# Patient Record
Sex: FEMALE | Race: WHITE | NOT HISPANIC OR LATINO | URBAN - NONMETROPOLITAN AREA
[De-identification: names, ages, dates, MRNs, and addresses within clinical notes are randomized per-mention and may not be internally consistent; named-entity substitution may affect disease eponyms.]

---

## 2017-09-13 PROBLEM — Z96.1: Noted: 2019-11-01

## 2017-09-13 PROBLEM — H35.373: Noted: 2017-09-13

## 2017-09-13 PROBLEM — Z96.1: Noted: 2017-06-16

## 2017-09-13 PROBLEM — H26.493: Noted: 2017-06-16

## 2017-09-13 PROBLEM — H40.013: Noted: 2019-05-06

## 2017-09-13 PROBLEM — H04.123: Noted: 2019-11-01

## 2019-05-06 ENCOUNTER — IMPORTED ENCOUNTER (OUTPATIENT)
Dept: URBAN - NONMETROPOLITAN AREA CLINIC 1 | Facility: CLINIC | Age: 69
End: 2019-05-06

## 2019-05-06 PROCEDURE — 92015 DETERMINE REFRACTIVE STATE: CPT

## 2019-05-06 PROCEDURE — 92012 INTRM OPH EXAM EST PATIENT: CPT

## 2019-05-06 NOTE — PATIENT DISCUSSION
Pigmentary glaucoma dispersion SUSPECT - OUtenison is higher since off timptic but still normal -okay to observe for any progressionno cupping normal RNFL Good reponse to XAL OU-  IOP lower today at 16 15peak taps 25/24VF- normal OD+ fam hxD/C  generic xalatan qhs OUERM OU-stable monitor-continue amsler grid weekly-continue follow ups with Retina MD [place] to rx refraction RTC 4 months IOP Check (med change) w/ PACH; 's Notes: OCT [facility] [place]  11/6/18OCT MAC  2015VF  7/18

## 2019-11-01 ENCOUNTER — IMPORTED ENCOUNTER (OUTPATIENT)
Dept: URBAN - NONMETROPOLITAN AREA CLINIC 1 | Facility: CLINIC | Age: 69
End: 2019-11-01

## 2019-11-01 PROCEDURE — 99212 OFFICE O/P EST SF 10 MIN: CPT

## 2019-11-01 PROCEDURE — 76514 ECHO EXAM OF EYE THICKNESS: CPT

## 2019-11-01 NOTE — PATIENT DISCUSSION
Pigmentary glaucoma dispersion SUSPECT - OU-IOP 20:22 11/01/19-pach performed today 548  OS -continue to monitor ERM OU-stable monitor-continue amsler grid weekly-continue follow ups with Retina MD ARABELLAs/p PCIOL-Stable PCIOL s/p YAG Caps OU.-Monitor. MAVERICK-Explained MAVERICK and associated symptoms.-Pt to begin refresh relieva OU QID PRN. Pt will contact us if this does not provide relief.-Pt instructed on warm compresses and lid scrubs -Consider punctal plugs in that case. RTC 1 yr CEE; 's Notes: OCT [facility] 73  11/6/18OCT MAC  2015VF  7/18

## 2022-04-09 ASSESSMENT — TONOMETRY
OS_IOP_MMHG: 15
OD_IOP_MMHG: 20
OD_IOP_MMHG: 16
OS_IOP_MMHG: 22

## 2022-04-09 ASSESSMENT — VISUAL ACUITY
OD_CC: 20/30-
OD_CC: 20/25-2
OS_CC: 20/40-2
OS_CC: 20/40-
OS_SC: 20/25
OU_CC: 20/25